# Patient Record
Sex: FEMALE
[De-identification: names, ages, dates, MRNs, and addresses within clinical notes are randomized per-mention and may not be internally consistent; named-entity substitution may affect disease eponyms.]

---

## 2023-06-27 PROBLEM — Z00.00 ENCOUNTER FOR PREVENTIVE HEALTH EXAMINATION: Status: ACTIVE | Noted: 2023-06-27

## 2023-06-28 ENCOUNTER — APPOINTMENT (OUTPATIENT)
Dept: PULMONOLOGY | Facility: CLINIC | Age: 48
End: 2023-06-28
Payer: COMMERCIAL

## 2023-06-28 VITALS
BODY MASS INDEX: 32.78 KG/M2 | RESPIRATION RATE: 16 BRPM | SYSTOLIC BLOOD PRESSURE: 135 MMHG | HEIGHT: 66 IN | OXYGEN SATURATION: 96 % | HEART RATE: 100 BPM | DIASTOLIC BLOOD PRESSURE: 97 MMHG | WEIGHT: 204 LBS | TEMPERATURE: 97 F

## 2023-06-28 DIAGNOSIS — G47.9 SLEEP DISORDER, UNSPECIFIED: ICD-10-CM

## 2023-06-28 DIAGNOSIS — Z86.59 PERSONAL HISTORY OF OTHER MENTAL AND BEHAVIORAL DISORDERS: ICD-10-CM

## 2023-06-28 DIAGNOSIS — R40.0 SOMNOLENCE: ICD-10-CM

## 2023-06-28 PROCEDURE — 99203 OFFICE O/P NEW LOW 30 MIN: CPT

## 2023-06-28 NOTE — DISCUSSION/SUMMARY
[FreeTextEntry1] : 48-year-old female with abnormal sleep.  PSG will be performed.  Proper sleep hygiene was discussed.  Diet and weight loss were also discussed.  She is to follow-up with her PMD as before.

## 2023-06-28 NOTE — HISTORY OF PRESENT ILLNESS
[Never] : never [TextBox_4] : 48-year-old female presents for evaluation of sleep-related complaints.  Patient states that she has difficulty falling asleep and maintaining sleep with frequent arousals.  She snores but is unaware of apneic episodes.  She complains of daytime somnolence and fatigue.  Her weight has increased by 20 pounds recently.  She denies sedative-hypnotic drug use or excessive alcohol intake. [Awakes Unrefreshed] : awakes unrefreshed [Daytime Somnolence] : daytime somnolence [Difficulty Maintaining Sleep] : difficulty maintaining sleep [Fatigue] : fatigue [Frequent Nocturnal Awakening] : frequent nocturnal awakening [Snoring] : snoring